# Patient Record
(demographics unavailable — no encounter records)

---

## 2024-10-17 NOTE — PLAN
[FreeTextEntry1] : annual: pap and hpv neg last yr not needed  *reg periods every 28 days mom br cancer but brca neg,  patient is brca neg

## 2024-10-17 NOTE — HISTORY OF PRESENT ILLNESS
[Patient reported mammogram was normal] : Patient reported mammogram was normal [FreeTextEntry1] : 46 y/o patient present for women's wellness exam. [Patient reported PAP Smear was normal] : Patient reported PAP Smear was normal [Mammogramdate] : 2024 [PapSmeardate] : 2023 [LMPDate] : 10/09/24 [Normal Amount/Duration] :  normal amount and duration [Regular Cycle Intervals] : periods have been regular [Currently Active] : currently active [Men] : men [FreeTextEntry2] : vasectomy